# Patient Record
Sex: FEMALE | Race: WHITE | Employment: PART TIME | URBAN - NONMETROPOLITAN AREA
[De-identification: names, ages, dates, MRNs, and addresses within clinical notes are randomized per-mention and may not be internally consistent; named-entity substitution may affect disease eponyms.]

---

## 2022-02-10 ENCOUNTER — NURSE ONLY (OUTPATIENT)
Dept: LAB | Age: 22
End: 2022-02-10

## 2022-02-22 LAB — CYTOLOGY THIN PREP PAP: NORMAL

## 2023-10-02 ENCOUNTER — HOSPITAL ENCOUNTER (EMERGENCY)
Age: 23
Discharge: HOME | End: 2023-10-03
Attending: EMERGENCY MEDICINE
Payer: COMMERCIAL

## 2023-10-02 DIAGNOSIS — R51.9 NONINTRACTABLE HEADACHE, UNSPECIFIED CHRONICITY PATTERN, UNSPECIFIED HEADACHE TYPE: Primary | ICD-10-CM

## 2023-10-02 PROCEDURE — 96375 TX/PRO/DX INJ NEW DRUG ADDON: CPT

## 2023-10-02 PROCEDURE — 96365 THER/PROPH/DIAG IV INF INIT: CPT

## 2023-10-02 PROCEDURE — 6360000002 HC RX W HCPCS: Performed by: STUDENT IN AN ORGANIZED HEALTH CARE EDUCATION/TRAINING PROGRAM

## 2023-10-02 PROCEDURE — 6360000002 HC RX W HCPCS: Performed by: EMERGENCY MEDICINE

## 2023-10-02 PROCEDURE — 96366 THER/PROPH/DIAG IV INF ADDON: CPT

## 2023-10-02 PROCEDURE — 2580000003 HC RX 258: Performed by: STUDENT IN AN ORGANIZED HEALTH CARE EDUCATION/TRAINING PROGRAM

## 2023-10-02 PROCEDURE — 99284 EMERGENCY DEPT VISIT MOD MDM: CPT

## 2023-10-02 PROCEDURE — 96376 TX/PRO/DX INJ SAME DRUG ADON: CPT

## 2023-10-02 RX ORDER — DIPHENHYDRAMINE HYDROCHLORIDE 50 MG/ML
25 INJECTION INTRAMUSCULAR; INTRAVENOUS ONCE
Status: COMPLETED | OUTPATIENT
Start: 2023-10-02 | End: 2023-10-02

## 2023-10-02 RX ORDER — PROCHLORPERAZINE EDISYLATE 5 MG/ML
10 INJECTION INTRAMUSCULAR; INTRAVENOUS ONCE
Status: COMPLETED | OUTPATIENT
Start: 2023-10-02 | End: 2023-10-02

## 2023-10-02 RX ORDER — MAGNESIUM SULFATE 1 G/100ML
1000 INJECTION INTRAVENOUS ONCE
Status: COMPLETED | OUTPATIENT
Start: 2023-10-02 | End: 2023-10-03

## 2023-10-02 RX ORDER — ONDANSETRON 2 MG/ML
4 INJECTION INTRAMUSCULAR; INTRAVENOUS ONCE
Status: COMPLETED | OUTPATIENT
Start: 2023-10-02 | End: 2023-10-02

## 2023-10-02 RX ORDER — MAGNESIUM SULFATE 1 G/100ML
1000 INJECTION INTRAVENOUS ONCE
Status: COMPLETED | OUTPATIENT
Start: 2023-10-02 | End: 2023-10-02

## 2023-10-02 RX ORDER — 0.9 % SODIUM CHLORIDE 0.9 %
1000 INTRAVENOUS SOLUTION INTRAVENOUS ONCE
Status: COMPLETED | OUTPATIENT
Start: 2023-10-02 | End: 2023-10-02

## 2023-10-02 RX ORDER — KETOROLAC TROMETHAMINE 30 MG/ML
15 INJECTION, SOLUTION INTRAMUSCULAR; INTRAVENOUS ONCE
Status: COMPLETED | OUTPATIENT
Start: 2023-10-02 | End: 2023-10-02

## 2023-10-02 RX ADMIN — ONDANSETRON 4 MG: 2 INJECTION INTRAMUSCULAR; INTRAVENOUS at 21:20

## 2023-10-02 RX ADMIN — PROCHLORPERAZINE EDISYLATE 10 MG: 5 INJECTION INTRAMUSCULAR; INTRAVENOUS at 23:05

## 2023-10-02 RX ADMIN — MAGNESIUM SULFATE HEPTAHYDRATE 1000 MG: 1 INJECTION, SOLUTION INTRAVENOUS at 21:24

## 2023-10-02 RX ADMIN — DIPHENHYDRAMINE HYDROCHLORIDE 25 MG: 50 INJECTION INTRAMUSCULAR; INTRAVENOUS at 23:06

## 2023-10-02 RX ADMIN — DIPHENHYDRAMINE HYDROCHLORIDE 25 MG: 50 INJECTION INTRAMUSCULAR; INTRAVENOUS at 21:18

## 2023-10-02 RX ADMIN — SODIUM CHLORIDE 1000 ML: 9 INJECTION, SOLUTION INTRAVENOUS at 21:24

## 2023-10-02 RX ADMIN — MAGNESIUM SULFATE HEPTAHYDRATE 1000 MG: 1 INJECTION, SOLUTION INTRAVENOUS at 23:23

## 2023-10-02 RX ADMIN — KETOROLAC TROMETHAMINE 15 MG: 30 INJECTION, SOLUTION INTRAMUSCULAR; INTRAVENOUS at 23:03

## 2023-10-02 ASSESSMENT — PAIN DESCRIPTION - LOCATION: LOCATION: HEAD

## 2023-10-02 ASSESSMENT — PAIN - FUNCTIONAL ASSESSMENT: PAIN_FUNCTIONAL_ASSESSMENT: 0-10

## 2023-10-02 ASSESSMENT — PAIN SCALES - GENERAL: PAINLEVEL_OUTOF10: 8

## 2023-10-02 ASSESSMENT — PAIN DESCRIPTION - DESCRIPTORS: DESCRIPTORS: ACHING

## 2023-10-02 NOTE — ED TRIAGE NOTES
Pt presents to ED with chief complaint of headache x 1 week. Denies history of migraines. Denies n/v. Pt taking ibuprofen with no relief at home.

## 2023-10-03 VITALS
WEIGHT: 165 LBS | HEIGHT: 69 IN | DIASTOLIC BLOOD PRESSURE: 66 MMHG | OXYGEN SATURATION: 99 % | HEART RATE: 57 BPM | TEMPERATURE: 98.3 F | BODY MASS INDEX: 24.44 KG/M2 | SYSTOLIC BLOOD PRESSURE: 120 MMHG | RESPIRATION RATE: 17 BRPM

## 2023-10-03 ASSESSMENT — PAIN SCALES - GENERAL: PAINLEVEL_OUTOF10: 2

## 2023-10-03 NOTE — ED NOTES
Patient assisted to restroom and returned to bed at this time. Patient resting in bed. Respirations easy and unlabored. No distress noted. Call light within reach.       Gina Alvares RN  10/02/23 0549

## 2023-10-03 NOTE — DISCHARGE INSTRUCTIONS
Continue the present plan for headache. Your symptoms improved after getting treatment in the emergency department. If you continue to have headaches, create a headache diary. Return to the emergency department immediately if you develop any new concerning symptoms such as vision changes, nausea, vomiting, motor or sensory changes. Headaches that wake you up from sleep.

## 2023-10-03 NOTE — ED NOTES
Patient resting in bed. Respirations easy and unlabored. No distress noted. Call light within reach.       Nilsa Saravia, COLLIN  10/02/23 1811

## 2023-10-03 NOTE — ED NOTES
Patient reporting head pain is down to 2/10. Provider notified. Patient resting in bed. Respirations easy and unlabored. No distress noted. Call light within reach.       Wendy Guevara RN  10/03/23 9610

## 2023-10-03 NOTE — ED NOTES
Patient medicated per MAR. Patient resting in bed. Respirations easy and unlabored. No distress noted. Call light within reach. Family at the bedside.       Twin Mathews RN  10/02/23 9641

## 2023-10-03 NOTE — ED NOTES
Patient medicated per MAR. Patient resting in bed. Respirations easy and unlabored. No distress noted. Call light within reach.       Laurie Palm, COLLIN  10/02/23 5129

## 2024-03-19 ENCOUNTER — OFFICE VISIT (OUTPATIENT)
Age: 24
End: 2024-03-19

## 2024-03-19 VITALS
HEIGHT: 69 IN | HEART RATE: 72 BPM | WEIGHT: 180 LBS | DIASTOLIC BLOOD PRESSURE: 80 MMHG | TEMPERATURE: 96.9 F | RESPIRATION RATE: 18 BRPM | OXYGEN SATURATION: 99 % | BODY MASS INDEX: 26.66 KG/M2 | SYSTOLIC BLOOD PRESSURE: 130 MMHG

## 2024-03-19 DIAGNOSIS — J06.9 ACUTE UPPER RESPIRATORY INFECTION: Primary | ICD-10-CM

## 2024-03-19 LAB
INFLUENZA VIRUS A RNA: NEGATIVE
INFLUENZA VIRUS B RNA: NEGATIVE
Lab: NORMAL
QC PASS/FAIL: NORMAL
SARS-COV-2 RDRP RESP QL NAA+PROBE: NEGATIVE
STREPTOCOCCUS A RNA: NEGATIVE

## 2024-03-19 PROCEDURE — 87502 INFLUENZA DNA AMP PROBE: CPT | Performed by: NURSE PRACTITIONER

## 2024-03-19 PROCEDURE — 87651 STREP A DNA AMP PROBE: CPT | Performed by: NURSE PRACTITIONER

## 2024-03-19 PROCEDURE — 99203 OFFICE O/P NEW LOW 30 MIN: CPT | Performed by: NURSE PRACTITIONER

## 2024-03-19 PROCEDURE — 87635 SARS-COV-2 COVID-19 AMP PRB: CPT | Performed by: NURSE PRACTITIONER

## 2024-03-19 SDOH — ECONOMIC STABILITY: FOOD INSECURITY: WITHIN THE PAST 12 MONTHS, YOU WORRIED THAT YOUR FOOD WOULD RUN OUT BEFORE YOU GOT MONEY TO BUY MORE.: NEVER TRUE

## 2024-03-19 SDOH — ECONOMIC STABILITY: FOOD INSECURITY: WITHIN THE PAST 12 MONTHS, THE FOOD YOU BOUGHT JUST DIDN'T LAST AND YOU DIDN'T HAVE MONEY TO GET MORE.: NEVER TRUE

## 2024-03-19 SDOH — ECONOMIC STABILITY: HOUSING INSECURITY
IN THE LAST 12 MONTHS, WAS THERE A TIME WHEN YOU DID NOT HAVE A STEADY PLACE TO SLEEP OR SLEPT IN A SHELTER (INCLUDING NOW)?: NO

## 2024-03-19 SDOH — ECONOMIC STABILITY: INCOME INSECURITY: HOW HARD IS IT FOR YOU TO PAY FOR THE VERY BASICS LIKE FOOD, HOUSING, MEDICAL CARE, AND HEATING?: NOT HARD AT ALL

## 2024-03-19 ASSESSMENT — PATIENT HEALTH QUESTIONNAIRE - PHQ9
SUM OF ALL RESPONSES TO PHQ9 QUESTIONS 1 & 2: 0
1. LITTLE INTEREST OR PLEASURE IN DOING THINGS: NOT AT ALL
SUM OF ALL RESPONSES TO PHQ QUESTIONS 1-9: 0
2. FEELING DOWN, DEPRESSED OR HOPELESS: NOT AT ALL
SUM OF ALL RESPONSES TO PHQ QUESTIONS 1-9: 0

## 2024-03-19 ASSESSMENT — ENCOUNTER SYMPTOMS
DIARRHEA: 0
RHINORRHEA: 1
VOMITING: 0
CHEST TIGHTNESS: 0
SHORTNESS OF BREATH: 0
WHEEZING: 0
NAUSEA: 0

## 2024-03-19 NOTE — PROGRESS NOTES
[facility] [place] [facility]  [facility]  [address] [facility] [address]  Dept: [phone]  Loc: [phone]     Mary Ryan is a 23 y.o. female who presents today for:  Chief Complaint   Patient presents with    Pharyngitis     Sinus congestion , sore throat woke up today with these s/s denies taking any OTC medications today        Assessment/Plan:     1. Acute upper respiratory infection  -strep, covid, and flu neg.  Pt has had mono in the past- no testing needed  -discussed flu/covid could be false neg d/t sxs x less than 24 hours- advised re-testing in 48 hours if sxs worsen       Results for orders placed or performed in visit on 03/19/24   POCT Rapid Strep A DNA (Alere i)   Result Value Ref Range    Streptococcus A RNA Negative    POCT COVID-19 Rapid, NAAT   Result Value Ref Range    SARS-COV-2, RdRp gene Negative Negative    Lot Number [id number]     QC Pass/Fail Pass    POCT Influenza A/B DNA (Alere i)   Result Value Ref Range    Influenza virus A RNA Negative     Influenza virus B RNA Negative         Medications Ordered:  No orders of the defined types were placed in this encounter.      No follow-ups on file.    No future appointments.    HPI:   Pt presents with congestion, sore throat, cough x less than 24 hours.  Denies fever and body aches.  Has not taken any medication for sxs.      Pharyngitis  This is a new problem. The current episode started yesterday. Associated symptoms include congestion, coughing and a sore throat. Pertinent negatives include no chills, fever, headaches, myalgias, nausea, rash or vomiting. She has tried nothing for the symptoms.         Subjective:      Review of Systems   Constitutional:  Negative for chills and fever.   HENT:  Positive for congestion, rhinorrhea and sore throat.    Respiratory:  Positive for cough. Negative for chest tightness, shortness of breath and wheezing.    Gastrointestinal:  Negative for